# Patient Record
Sex: FEMALE | URBAN - METROPOLITAN AREA
[De-identification: names, ages, dates, MRNs, and addresses within clinical notes are randomized per-mention and may not be internally consistent; named-entity substitution may affect disease eponyms.]

---

## 2019-11-11 ENCOUNTER — NEW PATIENT (OUTPATIENT)
Dept: URBAN - METROPOLITAN AREA CLINIC 30 | Facility: CLINIC | Age: 39
End: 2019-11-11
Payer: MEDICAID

## 2019-11-11 DIAGNOSIS — E11.9 TYPE 2 DIABETES MELLITUS WITHOUT COMPLICATIONS: Primary | ICD-10-CM

## 2019-11-11 DIAGNOSIS — Z79.84 LONG TERM (CURRENT) USE OF ORAL ANTIDIABETIC DRUGS: ICD-10-CM

## 2019-11-11 PROCEDURE — 92004 COMPRE OPH EXAM NEW PT 1/>: CPT | Performed by: OPTOMETRIST

## 2019-11-11 ASSESSMENT — INTRAOCULAR PRESSURE
OS: 15
OD: 14

## 2019-11-11 ASSESSMENT — KERATOMETRY
OD: 44.87
OS: 44.68

## 2019-11-11 ASSESSMENT — VISUAL ACUITY
OS: 20/20
OD: 20/20

## 2021-12-02 ENCOUNTER — OFFICE VISIT (OUTPATIENT)
Dept: URBAN - METROPOLITAN AREA CLINIC 10 | Facility: CLINIC | Age: 41
End: 2021-12-02
Payer: MEDICAID

## 2021-12-02 DIAGNOSIS — H52.13 MYOPIA, BILATERAL: ICD-10-CM

## 2021-12-02 PROCEDURE — 92014 COMPRE OPH EXAM EST PT 1/>: CPT | Performed by: STUDENT IN AN ORGANIZED HEALTH CARE EDUCATION/TRAINING PROGRAM

## 2021-12-02 PROCEDURE — 92134 CPTRZ OPH DX IMG PST SGM RTA: CPT | Performed by: STUDENT IN AN ORGANIZED HEALTH CARE EDUCATION/TRAINING PROGRAM

## 2021-12-02 ASSESSMENT — VISUAL ACUITY
OD: 20/20
OS: 20/20

## 2021-12-02 ASSESSMENT — INTRAOCULAR PRESSURE
OS: 15
OD: 15

## 2021-12-02 NOTE — IMPRESSION/PLAN
Impression: Type 2 diabetes mellitus without complications Plan: Patient educated on condition, importance of diet, exercise, and regular follow ups with PCP. No NPDR or DME present, good blood sugar control continue to monitor with annual DM DFE.

## 2021-12-02 NOTE — IMPRESSION/PLAN
Impression: Myopia, bilateral: H52.13. Plan: Patient educated on condition, no SRx needed at this time, continue with OTC readers. Monitor.